# Patient Record
Sex: FEMALE | Race: BLACK OR AFRICAN AMERICAN | NOT HISPANIC OR LATINO | Employment: FULL TIME | URBAN - METROPOLITAN AREA
[De-identification: names, ages, dates, MRNs, and addresses within clinical notes are randomized per-mention and may not be internally consistent; named-entity substitution may affect disease eponyms.]

---

## 2017-10-12 ENCOUNTER — HOSPITAL ENCOUNTER (EMERGENCY)
Facility: HOSPITAL | Age: 52
Discharge: HOME | End: 2017-10-12
Attending: EMERGENCY MEDICINE
Payer: COMMERCIAL

## 2017-10-12 VITALS
HEIGHT: 65 IN | DIASTOLIC BLOOD PRESSURE: 91 MMHG | OXYGEN SATURATION: 99 % | RESPIRATION RATE: 18 BRPM | TEMPERATURE: 99 F | HEART RATE: 69 BPM | BODY MASS INDEX: 29.16 KG/M2 | WEIGHT: 175 LBS | SYSTOLIC BLOOD PRESSURE: 131 MMHG

## 2017-10-12 DIAGNOSIS — M17.11 ARTHRITIS OF RIGHT KNEE: Primary | ICD-10-CM

## 2017-10-12 DIAGNOSIS — R52 PAIN: ICD-10-CM

## 2017-10-12 PROCEDURE — 63600175 PHARM REV CODE 636 W HCPCS: Performed by: EMERGENCY MEDICINE

## 2017-10-12 PROCEDURE — 99284 EMERGENCY DEPT VISIT MOD MDM: CPT

## 2017-10-12 PROCEDURE — 29505 APPLICATION LONG LEG SPLINT: CPT | Mod: RT

## 2017-10-12 RX ORDER — MELOXICAM 15 MG/1
15 TABLET ORAL DAILY
Qty: 30 TABLET | Refills: 0 | Status: SHIPPED | OUTPATIENT
Start: 2017-10-12

## 2017-10-12 RX ORDER — METHYLPREDNISOLONE 4 MG/1
TABLET ORAL
Qty: 1 PACKAGE | Refills: 0 | Status: SHIPPED | OUTPATIENT
Start: 2017-10-12 | End: 2017-11-02

## 2017-10-12 RX ORDER — CELECOXIB 200 MG/1
200 CAPSULE ORAL 2 TIMES DAILY
COMMUNITY
End: 2017-10-12 | Stop reason: HOSPADM

## 2017-10-12 RX ORDER — AMLODIPINE BESYLATE 10 MG/1
10 TABLET ORAL DAILY
COMMUNITY

## 2017-10-12 RX ORDER — PREDNISONE 20 MG/1
60 TABLET ORAL
Status: COMPLETED | OUTPATIENT
Start: 2017-10-12 | End: 2017-10-12

## 2017-10-12 RX ORDER — TRAMADOL HYDROCHLORIDE 50 MG/1
50 TABLET ORAL EVERY 6 HOURS PRN
Qty: 20 TABLET | Refills: 0 | Status: SHIPPED | OUTPATIENT
Start: 2017-10-12

## 2017-10-12 RX ORDER — OXYCODONE AND ACETAMINOPHEN 5; 325 MG/1; MG/1
1 TABLET ORAL EVERY 6 HOURS PRN
Qty: 18 TABLET | Refills: 0 | Status: SHIPPED | OUTPATIENT
Start: 2017-10-12 | End: 2017-10-12 | Stop reason: SINTOL

## 2017-10-12 RX ADMIN — PREDNISONE 60 MG: 20 TABLET ORAL at 01:10

## 2017-10-12 NOTE — ED PROVIDER NOTES
"Encounter Date: 10/12/2017       History     Chief Complaint   Patient presents with    Knee Pain     swelling since this morning, with a slight limp all week, pt with history of arhritis, no warmth or redness report per pt     CHIEF COMPLIANT: Knee Pain (swelling since this morning, with a slight limp all week, pt with history of arhritis, no warmth or redness report per pt)      10/12/2017, 10:51 AM     The history is provided by the patient. Christina Bangura is a 52 y.o. female presenting to the ED for right knee pain.  Patient has been having knee pain all week.  She has a prior history of meniscus tear.  She states that the pain has gradually worsened over the last 7 days.  The pain is at a 0 when she is sitting still, however when she is attempting to bend her knee or stand up, the pain goes up to a 6 out of 10.  The pain is located to the right knee.  Patient denies any trauma, calf pain, calf tenderness, chest pain, chest pressure, fever, sickle cell disease.  Prior treatment includes Celebrex 200 mg twice a day.    PCP: Provider [facility]  Specialist:             Review of patient's allergies indicates:   Allergen Reactions    Hydrocodone Palpitations     "chest distress" per patient     Past Medical History:   Diagnosis Date    Arthritis     Hypertension      Past Surgical History:   Procedure Laterality Date     SECTION      CHOLECYSTECTOMY      HYSTERECTOMY       History reviewed. No pertinent family history.  Social History   Substance Use Topics    Smoking status: Never Smoker    Smokeless tobacco: Never Used    Alcohol use Yes      Comment: seldom     Review of Systems   Constitutional: Negative for fever.   HENT: Negative for sore throat.    Respiratory: Negative for shortness of breath.    Cardiovascular: Negative for chest pain.   Gastrointestinal: Negative for nausea.   Genitourinary: Negative for dysuria.   Musculoskeletal: Positive for arthralgias (Right knee). Negative for " "back pain.   Skin: Negative for rash.   Neurological: Negative for weakness.   Hematological: Does not bruise/bleed easily.       Physical Exam     Initial Vitals [10/12/17 1024]   BP Pulse Resp Temp SpO2   (!) 162/89 76 20 98.5 °F (36.9 °C) 98 %      MAP       113.33         Vitals:    10/12/17 1024 10/12/17 1254 10/12/17 1339   BP: (!) 162/89 (!) 133/97 (!) 131/91   Pulse: 76 73 69   Resp: 20 18 18   Temp: 98.5 °F (36.9 °C)     TempSrc: Oral     SpO2: 98% 100% 99%   Weight: 79.4 kg (175 lb)     Height: 5' 5" (1.651 m)         Physical Exam    Nursing note and vitals reviewed.  Constitutional: She appears well-developed and well-nourished.   Obese.   HENT:   Head: Normocephalic and atraumatic.   Eyes: Conjunctivae and EOM are normal. Pupils are equal, round, and reactive to light.   Neck: Normal range of motion.   Cardiovascular: Normal rate, regular rhythm and normal heart sounds.   Pulmonary/Chest: Breath sounds normal. No respiratory distress.   Abdominal: Soft. Bowel sounds are normal. She exhibits no mass. There is no rebound and no guarding.   Musculoskeletal: Normal range of motion.        Right knee: She exhibits swelling, effusion and bony tenderness. She exhibits no ecchymosis and no deformity.        Cervical back: She exhibits no tenderness and no bony tenderness.        Thoracic back: She exhibits no tenderness and no bony tenderness.        Lumbar back: She exhibits no tenderness and no bony tenderness.   Range of motion of the right hip, normal range of motion of the left ankle.   Neurological: She is alert and oriented to person, place, and time. She has normal strength. No cranial nerve deficit.   Cranial nerves II-XII intact   Skin: Skin is warm and dry.   Psychiatric: She has a normal mood and affect. Her speech is normal and behavior is normal. Thought content normal.         ED Course   Splint Application  Date/Time: 10/12/2017 1:58 PM  Performed by: EVAN MADDOX.  Authorized by: PASHA, " PENNY BOTELLOEmilie   Location details: right knee  Supplies used: Knee immobilizer.  Post-procedure: The splinted body part was neurovascularly unchanged following the procedure.  Patient tolerance: Patient tolerated the procedure well with no immediate complications        Labs Reviewed - No data to display     Imaging Results          CT Lower Extremity Without Cont Right (Final result)  Result time 10/12/17 13:09:56    Final result by Humphrey Mitchell MD (10/12/17 13:09:56)                 Impression:         No evidence of acute fracture.  Tricompartment DJD most pronounced involving the lateral knee compartment.  Joint effusion.            All CT scans at this facility use dose modulation, iterative reconstruction and/or weight based dosing when appropriate to reduce radiation dose to as low as reasonably achievable.       Electronically signed by: HUMPHREY MITCHELL MD  Date:     10/12/17  Time:    13:09              Narrative:    CT LOWER EXTREMITY WITHOUT CONT RIGHT    Date: 10/12/17 12:40:22    Clinical indication:  right knee pain     Technique:  CT right knee with coronal and sagittal reformats. Correlation with plain film x-ray from the same day.     Findings:  No acute fracture line is seen.  Lateral joint space narrowing with subchondral sclerosis is present.  Marginal spurring both of the medial and the lateral femoral condyle is present.  Moderate patellofemoral joint spurring is present.    Small joint effusion is present.     No additional findings.                             X-Ray Knee Complete 4 Or More Views Right (Final result)  Result time 10/12/17 11:42:52    Final result by CHEN Shore Sr., MD (10/12/17 11:42:52)                 Impression:      1. There is a possible nondisplaced fracture in the lateral femoral condyle. If additional imaging evaluation is clinically indicated, I recommend consideration of a CT examination of the right knee without IV contrast.  2. There is a moderate-sized  joint effusion in the right knee.  3. There is moderate narrowing of the joint space height of the lateral compartment of the right knee.      Electronically signed by: CHEN DIEZ MD  Date:     10/12/17  Time:    11:42              Narrative:    4 view x-ray of the right knee    Clinical History:     Pain, unspecified    Findings: There is a possible nondisplaced fracture in the lateral femoral condyle. There is no dislocation. There is a moderate-sized joint effusion in the right knee. There is moderate narrowing of the joint space height of the lateral compartment of the right knee.                                                   ED Course    No results found for this or any previous visit.    Medications   predniSONE tablet 60 mg (60 mg Oral Given 10/12/17 3291)       1:31 PM Reassessment: Dr. Garvin reassessed the pt.  The pt is resting comfortably and is NAD.  Pt states their sx have improved. Discussed test results, shared treatment plan, specific conditions for return, and the need for f/u.  Answered their questions at this time.  Pt understands and agrees to the plan.  The pt has remained hemodynamically stable through ED course and is stable for discharge.     1:57 PM patient states that she can not take percocet either. Will change to ultram.     Follow-up Information     [facility]. Schedule an appointment as soon as possible for a visit in 2 days.    Specialty:  Orthopedics  Why:  Return to the ED for:  increasing pain, redness, swelling or other concerns.  Contact information:  [address] [address] [address] [phone]                   Discharge Medication List as of 10/12/2017  2:03 PM      START taking these medications    Details   meloxicam (MOBIC) 15 MG tablet Take 1 tablet (15 mg total) by mouth once daily., Starting Thu 10/12/2017, Print      methylPREDNISolone (MEDROL DOSEPACK) 4 mg tablet As directed., Print      tramadol (ULTRAM) 50 mg tablet Take 1  tablet (50 mg total) by mouth every 6 (six) hours as needed for Pain., Starting Thu 10/12/2017, Print              Discharge Medication List as of 10/12/2017  2:03 PM      STOP taking these medications       celecoxib (CELEBREX) 200 MG capsule Comments:   Reason for Stopping:                     Clinical Impression:       ICD-10-CM ICD-9-CM   1. Arthritis of right knee M17.11 716.96   2. Pain R52 780.96         Disposition:   Disposition: Discharged  Condition: Stable                        Denise Garvin, DO  10/12/17 3112

## 2018-04-16 ENCOUNTER — HOSPITAL ENCOUNTER (EMERGENCY)
Facility: HOSPITAL | Age: 53
Discharge: HOME | End: 2018-04-16
Attending: EMERGENCY MEDICINE
Payer: COMMERCIAL

## 2018-04-16 VITALS
HEART RATE: 92 BPM | RESPIRATION RATE: 20 BRPM | OXYGEN SATURATION: 98 % | BODY MASS INDEX: 46.48 KG/M2 | SYSTOLIC BLOOD PRESSURE: 137 MMHG | WEIGHT: 279 LBS | DIASTOLIC BLOOD PRESSURE: 92 MMHG | HEIGHT: 65 IN | TEMPERATURE: 98 F

## 2018-04-16 DIAGNOSIS — G89.29 CHRONIC PAIN OF RIGHT KNEE: Primary | ICD-10-CM

## 2018-04-16 DIAGNOSIS — M25.561 CHRONIC PAIN OF RIGHT KNEE: Primary | ICD-10-CM

## 2018-04-16 PROCEDURE — 99283 EMERGENCY DEPT VISIT LOW MDM: CPT

## 2018-04-16 RX ORDER — MELOXICAM 15 MG/1
15 TABLET ORAL DAILY
Qty: 20 TABLET | Refills: 0 | Status: SHIPPED | OUTPATIENT
Start: 2018-04-16

## 2018-04-16 NOTE — ED NOTES
Pt rep[orts having steroid injection to left knee on Tuesday. Pt states swelling to face and right knee since Friday, none noted today

## 2018-04-16 NOTE — ED PROVIDER NOTES
"Encounter Date: 2018       History     Chief Complaint   Patient presents with    Allergic Reaction     "i think im having a reaction to the injection I got in my knee" since friday. injection on tuesday     The history is provided by the patient.   Allergic Reaction   The primary symptoms do not include wheezing, shortness of breath, cough, abdominal pain, nausea, vomiting, diarrhea, dizziness, palpitations, altered mental status, rash, angioedema or urticaria. Primary symptoms comment: Right knee pain- had an injection last week and follow up tomorrow. Illness onset: Chronically. The problem has been rapidly improving. This is a chronic problem.   Associated with: recent injection in knee. Significant symptoms that are not present include eye redness, flushing, rhinorrhea or itching. Associated symptoms comments: Chronic Pain.     Review of patient's allergies indicates:   Allergen Reactions    Hydrocodone Palpitations     "chest distress" per patient    Percocet [oxycodone-acetaminophen]      Past Medical History:   Diagnosis Date    Arthritis     Hypertension      Past Surgical History:   Procedure Laterality Date     SECTION      CHOLECYSTECTOMY      HYSTERECTOMY       History reviewed. No pertinent family history.  Social History   Substance Use Topics    Smoking status: Never Smoker    Smokeless tobacco: Never Used    Alcohol use Yes      Comment: seldom     Review of Systems   HENT: Negative for rhinorrhea.    Eyes: Negative for redness.   Respiratory: Negative for cough, shortness of breath and wheezing.    Cardiovascular: Negative for palpitations.   Gastrointestinal: Negative for abdominal pain, diarrhea, nausea and vomiting.   Musculoskeletal:        Right Knee Pain-Chronic   Skin: Negative for flushing, itching and rash.   Neurological: Negative for dizziness.   All other systems reviewed and are negative.      Physical Exam     Initial Vitals [18 0719]   BP Pulse Resp Temp " SpO2   (!) 137/92 92 20 97.6 °F (36.4 °C) 98 %      MAP       107         Physical Exam    Nursing note and vitals reviewed.  Constitutional: She appears well-developed and well-nourished. No distress.   HENT:   Head: Normocephalic and atraumatic.   Mouth/Throat: Oropharynx is clear and moist.   Eyes: EOM are normal. Pupils are equal, round, and reactive to light.   Neck: Normal range of motion. Neck supple.   Cardiovascular: Normal rate and regular rhythm.   Pulmonary/Chest: Breath sounds normal.   Abdominal: Soft. Bowel sounds are normal.   Musculoskeletal: Normal range of motion. She exhibits no tenderness.        Right knee: Normal. She exhibits normal range of motion, no swelling, no effusion, no ecchymosis, no deformity, no laceration, no erythema, normal alignment, no LCL laxity, normal patellar mobility, no bony tenderness, normal meniscus and no MCL laxity. No tenderness found.   Neurological: She is alert and oriented to person, place, and time. She has normal strength.         ED Course   Procedures  Labs Reviewed - No data to display     7:40 AM - Counseling: Spoke with the patient and discussed todays findings, in addition to providing specific details for the plan of care and counseling regarding the diagnosis and prognosis. Questions are answered at this time. Pt reports having follow up tomorrow with provider that injected her knee. I discussed her normal exam and need to follow up with provider and no signs of allergic reaction including but not limited to negative edemea,swelling,warmth,rash,angioedema,hives,wheezing,VSSAF. Pt in NAD, requesting extended work excuse.                            Clinical Impression:   The encounter diagnosis was Chronic pain of right knee.    Disposition:   Disposition: Discharged  Condition: Stable                        Maicol Munoz MD  04/16/18 0743

## 2019-06-11 ENCOUNTER — HOSPITAL ENCOUNTER (EMERGENCY)
Age: 54
Discharge: LEFT BEFORE DISCHARGE | End: 2019-06-11

## 2019-06-12 NOTE — ED NOTES
When PT was called back to be roomed, she stated, \"I'm going to leave. \"  PT was told she would be room and evaluated now, and that there was no longer a wait, but again, she refused. PT did not appear to be in any visible distress.  Skin color normal for

## 2019-10-22 PROBLEM — K57.30 DIVERTICULOSIS OF LARGE INTESTINE WITHOUT HEMORRHAGE: Status: ACTIVE | Noted: 2019-10-22

## 2019-11-12 PROBLEM — Z98.890 HISTORY OF INCISIONAL HERNIA REPAIR: Status: ACTIVE | Noted: 2019-11-12

## 2019-11-12 PROBLEM — Z98.84 S/P GASTRIC BYPASS: Status: ACTIVE | Noted: 2019-11-12

## 2019-11-12 PROBLEM — Z87.19 HISTORY OF INCISIONAL HERNIA REPAIR: Status: ACTIVE | Noted: 2019-11-12

## 2019-11-27 PROCEDURE — 88305 TISSUE EXAM BY PATHOLOGIST: CPT | Performed by: INTERNAL MEDICINE

## 2020-06-17 PROBLEM — K59.09 CHRONIC CONSTIPATION: Status: ACTIVE | Noted: 2020-06-17

## 2020-09-07 ENCOUNTER — APPOINTMENT (OUTPATIENT)
Dept: CT IMAGING | Age: 55
End: 2020-09-07
Attending: EMERGENCY MEDICINE
Payer: COMMERCIAL

## 2020-09-07 ENCOUNTER — HOSPITAL ENCOUNTER (EMERGENCY)
Age: 55
Discharge: HOME | End: 2020-09-08
Attending: EMERGENCY MEDICINE
Payer: COMMERCIAL

## 2020-09-07 DIAGNOSIS — R51.9 RIGHT SIDED FACIAL PAIN: Primary | ICD-10-CM

## 2020-09-07 LAB
ALBUMIN SERPL-MCNC: 3.3 G/DL (ref 3.4–5)
ALBUMIN/GLOB SERPL: 1 {RATIO} (ref 1–2)
ALP LIVER SERPL-CCNC: 107 U/L (ref 46–118)
ALT SERPL-CCNC: 50 U/L (ref 13–56)
ANION GAP SERPL CALC-SCNC: 5 MMOL/L (ref 0–18)
AST SERPL-CCNC: 35 U/L (ref 15–37)
BASOPHILS # BLD AUTO: 0.04 X10(3) UL (ref 0–0.2)
BASOPHILS NFR BLD AUTO: 0.6 %
BILIRUB SERPL-MCNC: 0.3 MG/DL (ref 0.1–2)
BUN BLD-MCNC: 19 MG/DL (ref 7–18)
BUN/CREAT SERPL: 26.8 (ref 10–20)
CALCIUM BLD-MCNC: 8.9 MG/DL (ref 8.5–10.1)
CHLORIDE SERPL-SCNC: 109 MMOL/L (ref 98–112)
CO2 SERPL-SCNC: 27 MMOL/L (ref 21–32)
CREAT BLD-MCNC: 0.71 MG/DL (ref 0.55–1.02)
DEPRECATED RDW RBC AUTO: 46.5 FL (ref 35.1–46.3)
EOSINOPHIL # BLD AUTO: 0.17 X10(3) UL (ref 0–0.7)
EOSINOPHIL NFR BLD AUTO: 2.8 %
ERYTHROCYTE [DISTWIDTH] IN BLOOD BY AUTOMATED COUNT: 14.8 % (ref 11–15)
GLOBULIN PLAS-MCNC: 3.4 G/DL (ref 2.8–4.4)
GLUCOSE BLD-MCNC: 88 MG/DL (ref 70–99)
HCT VFR BLD AUTO: 36.9 % (ref 35–48)
HGB BLD-MCNC: 11.7 G/DL (ref 12–16)
IMM GRANULOCYTES # BLD AUTO: 0.01 X10(3) UL (ref 0–1)
IMM GRANULOCYTES NFR BLD: 0.2 %
LYMPHOCYTES # BLD AUTO: 1.55 X10(3) UL (ref 1–4)
LYMPHOCYTES NFR BLD AUTO: 25.1 %
M PROTEIN MFR SERPL ELPH: 6.7 G/DL (ref 6.4–8.2)
MCH RBC QN AUTO: 27.4 PG (ref 26–34)
MCHC RBC AUTO-ENTMCNC: 31.7 G/DL (ref 31–37)
MCV RBC AUTO: 86.4 FL (ref 80–100)
MONOCYTES # BLD AUTO: 0.42 X10(3) UL (ref 0.1–1)
MONOCYTES NFR BLD AUTO: 6.8 %
NEUTROPHILS # BLD AUTO: 3.98 X10 (3) UL (ref 1.5–7.7)
NEUTROPHILS # BLD AUTO: 3.98 X10(3) UL (ref 1.5–7.7)
NEUTROPHILS NFR BLD AUTO: 64.5 %
OSMOLALITY SERPL CALC.SUM OF ELEC: 294 MOSM/KG (ref 275–295)
PLATELET # BLD AUTO: 204 10(3)UL (ref 150–450)
POTASSIUM SERPL-SCNC: 3.7 MMOL/L (ref 3.5–5.1)
RBC # BLD AUTO: 4.27 X10(6)UL (ref 3.8–5.3)
SODIUM SERPL-SCNC: 141 MMOL/L (ref 136–145)
WBC # BLD AUTO: 6.2 X10(3) UL (ref 4–11)

## 2020-09-07 PROCEDURE — 80053 COMPREHEN METABOLIC PANEL: CPT | Performed by: EMERGENCY MEDICINE

## 2020-09-07 PROCEDURE — 70450 CT HEAD/BRAIN W/O DYE: CPT | Performed by: EMERGENCY MEDICINE

## 2020-09-07 PROCEDURE — 85025 COMPLETE CBC W/AUTO DIFF WBC: CPT | Performed by: EMERGENCY MEDICINE

## 2020-09-07 PROCEDURE — 36415 COLL VENOUS BLD VENIPUNCTURE: CPT

## 2020-09-07 PROCEDURE — 99284 EMERGENCY DEPT VISIT MOD MDM: CPT

## 2020-09-07 PROCEDURE — 70487 CT MAXILLOFACIAL W/DYE: CPT | Performed by: EMERGENCY MEDICINE

## 2020-09-07 RX ORDER — AMOXICILLIN AND CLAVULANATE POTASSIUM 875; 125 MG/1; MG/1
1 TABLET, FILM COATED ORAL 2 TIMES DAILY
Qty: 20 TABLET | Refills: 0 | Status: SHIPPED | OUTPATIENT
Start: 2020-09-07 | End: 2020-09-17

## 2020-09-08 VITALS
SYSTOLIC BLOOD PRESSURE: 159 MMHG | DIASTOLIC BLOOD PRESSURE: 56 MMHG | HEIGHT: 65 IN | BODY MASS INDEX: 31.16 KG/M2 | RESPIRATION RATE: 20 BRPM | OXYGEN SATURATION: 98 % | TEMPERATURE: 99 F | HEART RATE: 78 BPM | WEIGHT: 187 LBS

## 2020-09-08 RX ORDER — AMOXICILLIN AND CLAVULANATE POTASSIUM 875; 125 MG/1; MG/1
1 TABLET, FILM COATED ORAL ONCE
Status: COMPLETED | OUTPATIENT
Start: 2020-09-08 | End: 2020-09-08

## 2020-09-08 NOTE — ED INITIAL ASSESSMENT (HPI)
Pt to ed from home with c/o r side facial/jaw pain, denies injury or trauma, pt states pain radiates to head area,pt sx present last 24 hrs, pt alos c/o sob.

## 2020-09-08 NOTE — ED PROVIDER NOTES
Patient Seen in: THE [facility] In [place]      History   Patient presents with:  Pain    Stated Complaint:  right sided facial pain    HPI    Patient is a pleasant 53-year-old female, with multiple past medical problems, presenting for e Bx negative                    Social History    Tobacco Use      Smoking status: Never Smoker      Smokeless tobacco: Never Used    Alcohol use: Yes      Comment: rarely    Drug use:  No             Review of Systems    Positive for stated complaint:  susan Albumin 3.3 (*)     All other components within normal limits   CBC W/ DIFFERENTIAL - Abnormal; Notable for the following components:    HGB 11.7 (*)     RDW-SD 46.5 (*)     All other components within normal limits   CBC WITH DIFFERENTIAL WITH PLATELET BONES(CONTRAST ONLY) (CPT=70487)  COMPARISON:  [facility], CT, CT BRAIN OR HEAD (55638), 9/07/2020, 10:45 PM.  INDICATIONS:  right sided facial pain  TECHNIQUE:  CT images were created with non-ionic intravenous contrast through the facial bones.   3D shade initiated. Patient was observed while the laboratory and radiology studies were obtained. Results of the patient's laboratory and radiology studies were reviewed and discussed with the patient. Developing facial infection cannot be excluded.   A prescri

## 2020-10-05 PROBLEM — G44.209 TENSION HEADACHE: Status: ACTIVE | Noted: 2020-10-05

## 2020-10-05 PROBLEM — M26.609 TMJ DYSFUNCTION: Status: ACTIVE | Noted: 2020-10-05

## 2020-11-12 ENCOUNTER — APPOINTMENT (OUTPATIENT)
Dept: CT IMAGING | Facility: HOSPITAL | Age: 55
DRG: 336 | End: 2020-11-12
Attending: HOSPITALIST
Payer: COMMERCIAL

## 2020-11-12 ENCOUNTER — HOSPITAL ENCOUNTER (INPATIENT)
Facility: HOSPITAL | Age: 55
LOS: 5 days | Discharge: HOME HEALTH | DRG: 336 | End: 2020-11-17
Attending: EMERGENCY MEDICINE | Admitting: SURGERY
Payer: COMMERCIAL

## 2020-11-12 ENCOUNTER — APPOINTMENT (OUTPATIENT)
Dept: CT IMAGING | Age: 55
DRG: 336 | End: 2020-11-12
Attending: EMERGENCY MEDICINE
Payer: COMMERCIAL

## 2020-11-12 ENCOUNTER — ANESTHESIA (OUTPATIENT)
Dept: SURGERY | Facility: HOSPITAL | Age: 55
DRG: 336 | End: 2020-11-12
Payer: COMMERCIAL

## 2020-11-12 ENCOUNTER — ANESTHESIA EVENT (OUTPATIENT)
Dept: SURGERY | Facility: HOSPITAL | Age: 55
DRG: 336 | End: 2020-11-12
Payer: COMMERCIAL

## 2020-11-12 DIAGNOSIS — K56.609 SMALL BOWEL OBSTRUCTION (HCC): ICD-10-CM

## 2020-11-12 DIAGNOSIS — K56.609 BOWEL OBSTRUCTION (HCC): Primary | ICD-10-CM

## 2020-11-12 DIAGNOSIS — R10.9 ABDOMINAL PAIN, ACUTE: Primary | ICD-10-CM

## 2020-11-12 PROBLEM — R73.9 HYPERGLYCEMIA: Status: ACTIVE | Noted: 2020-11-12

## 2020-11-12 PROBLEM — R79.89 AZOTEMIA: Status: ACTIVE | Noted: 2020-11-12

## 2020-11-12 PROCEDURE — 70450 CT HEAD/BRAIN W/O DYE: CPT | Performed by: HOSPITALIST

## 2020-11-12 PROCEDURE — 0DN80ZZ RELEASE SMALL INTESTINE, OPEN APPROACH: ICD-10-PCS | Performed by: SURGERY

## 2020-11-12 PROCEDURE — 0D9670Z DRAINAGE OF STOMACH WITH DRAINAGE DEVICE, VIA NATURAL OR ARTIFICIAL OPENING: ICD-10-PCS | Performed by: INTERNAL MEDICINE

## 2020-11-12 PROCEDURE — 74177 CT ABD & PELVIS W/CONTRAST: CPT | Performed by: EMERGENCY MEDICINE

## 2020-11-12 DEVICE — SEPRAFILM ADHESION BARRIER (MEMBRANE) IS A STERILE, BIORESORBABLE, TRANSLUCENT ADHESION BARRIER COMPOSED OF TWO ANIONIC POLYSACCHARIDES, SODIUM HYALURONATE (HA) AND CARBOXYMETHYLCELLULOSE (CMC).
Type: IMPLANTABLE DEVICE | Site: ABDOMEN | Status: FUNCTIONAL
Brand: SEPRAFILM

## 2020-11-12 RX ORDER — NALOXONE HYDROCHLORIDE 0.4 MG/ML
0.08 INJECTION, SOLUTION INTRAMUSCULAR; INTRAVENOUS; SUBCUTANEOUS
Status: DISCONTINUED | OUTPATIENT
Start: 2020-11-12 | End: 2020-11-16

## 2020-11-12 RX ORDER — BUPIVACAINE HYDROCHLORIDE 5 MG/ML
INJECTION, SOLUTION EPIDURAL; INTRACAUDAL AS NEEDED
Status: DISCONTINUED | OUTPATIENT
Start: 2020-11-12 | End: 2020-11-12 | Stop reason: HOSPADM

## 2020-11-12 RX ORDER — ONDANSETRON 2 MG/ML
4 INJECTION INTRAMUSCULAR; INTRAVENOUS EVERY 4 HOURS PRN
Status: CANCELLED | OUTPATIENT
Start: 2020-11-12

## 2020-11-12 RX ORDER — KETOROLAC TROMETHAMINE 30 MG/ML
30 INJECTION, SOLUTION INTRAMUSCULAR; INTRAVENOUS EVERY 6 HOURS PRN
Status: DISCONTINUED | OUTPATIENT
Start: 2020-11-12 | End: 2020-11-12

## 2020-11-12 RX ORDER — ONDANSETRON 2 MG/ML
4 INJECTION INTRAMUSCULAR; INTRAVENOUS EVERY 6 HOURS PRN
Status: DISCONTINUED | OUTPATIENT
Start: 2020-11-12 | End: 2020-11-16

## 2020-11-12 RX ORDER — SODIUM CHLORIDE 9 MG/ML
INJECTION, SOLUTION INTRAVENOUS CONTINUOUS
Status: DISCONTINUED | OUTPATIENT
Start: 2020-11-12 | End: 2020-11-17

## 2020-11-12 RX ORDER — MEPERIDINE HYDROCHLORIDE 25 MG/ML
12.5 INJECTION INTRAMUSCULAR; INTRAVENOUS; SUBCUTANEOUS AS NEEDED
Status: DISCONTINUED | OUTPATIENT
Start: 2020-11-12 | End: 2020-11-12 | Stop reason: HOSPADM

## 2020-11-12 RX ORDER — ACETAMINOPHEN 500 MG
1000 TABLET ORAL ONCE
Status: DISCONTINUED | OUTPATIENT
Start: 2020-11-12 | End: 2020-11-12 | Stop reason: HOSPADM

## 2020-11-12 RX ORDER — ROCURONIUM BROMIDE 10 MG/ML
INJECTION, SOLUTION INTRAVENOUS AS NEEDED
Status: DISCONTINUED | OUTPATIENT
Start: 2020-11-12 | End: 2020-11-12 | Stop reason: SURG

## 2020-11-12 RX ORDER — ONDANSETRON 2 MG/ML
4 INJECTION INTRAMUSCULAR; INTRAVENOUS ONCE
Status: COMPLETED | OUTPATIENT
Start: 2020-11-12 | End: 2020-11-12

## 2020-11-12 RX ORDER — HYDROMORPHONE HYDROCHLORIDE 1 MG/ML
0.4 INJECTION, SOLUTION INTRAMUSCULAR; INTRAVENOUS; SUBCUTANEOUS EVERY 5 MIN PRN
Status: DISCONTINUED | OUTPATIENT
Start: 2020-11-12 | End: 2020-11-12 | Stop reason: HOSPADM

## 2020-11-12 RX ORDER — METOCLOPRAMIDE HYDROCHLORIDE 5 MG/ML
10 INJECTION INTRAMUSCULAR; INTRAVENOUS EVERY 6 HOURS PRN
Status: DISCONTINUED | OUTPATIENT
Start: 2020-11-12 | End: 2020-11-17

## 2020-11-12 RX ORDER — SODIUM CHLORIDE, SODIUM LACTATE, POTASSIUM CHLORIDE, CALCIUM CHLORIDE 600; 310; 30; 20 MG/100ML; MG/100ML; MG/100ML; MG/100ML
INJECTION, SOLUTION INTRAVENOUS CONTINUOUS
Status: DISCONTINUED | OUTPATIENT
Start: 2020-11-12 | End: 2020-11-12 | Stop reason: ALTCHOICE

## 2020-11-12 RX ORDER — MIDAZOLAM HYDROCHLORIDE 1 MG/ML
INJECTION INTRAMUSCULAR; INTRAVENOUS AS NEEDED
Status: DISCONTINUED | OUTPATIENT
Start: 2020-11-12 | End: 2020-11-12 | Stop reason: SURG

## 2020-11-12 RX ORDER — MORPHINE SULFATE 4 MG/ML
4 INJECTION, SOLUTION INTRAMUSCULAR; INTRAVENOUS EVERY 2 HOUR PRN
Status: CANCELLED | OUTPATIENT
Start: 2020-11-12

## 2020-11-12 RX ORDER — ONDANSETRON 2 MG/ML
INJECTION INTRAMUSCULAR; INTRAVENOUS AS NEEDED
Status: DISCONTINUED | OUTPATIENT
Start: 2020-11-12 | End: 2020-11-12 | Stop reason: SURG

## 2020-11-12 RX ORDER — SODIUM CHLORIDE, SODIUM LACTATE, POTASSIUM CHLORIDE, CALCIUM CHLORIDE 600; 310; 30; 20 MG/100ML; MG/100ML; MG/100ML; MG/100ML
INJECTION, SOLUTION INTRAVENOUS CONTINUOUS
Status: DISCONTINUED | OUTPATIENT
Start: 2020-11-12 | End: 2020-11-12 | Stop reason: HOSPADM

## 2020-11-12 RX ORDER — FAMOTIDINE 10 MG/ML
20 INJECTION, SOLUTION INTRAVENOUS 2 TIMES DAILY
Status: DISCONTINUED | OUTPATIENT
Start: 2020-11-12 | End: 2020-11-17

## 2020-11-12 RX ORDER — HEPARIN SODIUM 5000 [USP'U]/ML
5000 INJECTION, SOLUTION INTRAVENOUS; SUBCUTANEOUS EVERY 8 HOURS SCHEDULED
Status: CANCELLED | OUTPATIENT
Start: 2020-11-12

## 2020-11-12 RX ORDER — HYDROMORPHONE HYDROCHLORIDE 1 MG/ML
0.5 INJECTION, SOLUTION INTRAMUSCULAR; INTRAVENOUS; SUBCUTANEOUS EVERY 30 MIN PRN
Status: COMPLETED | OUTPATIENT
Start: 2020-11-12 | End: 2020-11-12

## 2020-11-12 RX ORDER — LIDOCAINE HYDROCHLORIDE 10 MG/ML
INJECTION, SOLUTION INFILTRATION; PERINEURAL AS NEEDED
Status: DISCONTINUED | OUTPATIENT
Start: 2020-11-12 | End: 2020-11-12 | Stop reason: HOSPADM

## 2020-11-12 RX ORDER — SODIUM CHLORIDE 9 MG/ML
INJECTION, SOLUTION INTRAVENOUS CONTINUOUS
Status: CANCELLED | OUTPATIENT
Start: 2020-11-12 | End: 2020-11-12

## 2020-11-12 RX ORDER — ACETAMINOPHEN 325 MG/1
650 TABLET ORAL EVERY 4 HOURS PRN
Status: CANCELLED | OUTPATIENT
Start: 2020-11-12

## 2020-11-12 RX ORDER — LIDOCAINE HYDROCHLORIDE 10 MG/ML
INJECTION, SOLUTION EPIDURAL; INFILTRATION; INTRACAUDAL; PERINEURAL AS NEEDED
Status: DISCONTINUED | OUTPATIENT
Start: 2020-11-12 | End: 2020-11-12 | Stop reason: SURG

## 2020-11-12 RX ORDER — HYDROMORPHONE HYDROCHLORIDE 1 MG/ML
0.5 INJECTION, SOLUTION INTRAMUSCULAR; INTRAVENOUS; SUBCUTANEOUS EVERY 30 MIN PRN
Status: CANCELLED | OUTPATIENT
Start: 2020-11-12 | End: 2020-11-12

## 2020-11-12 RX ORDER — HYDROCODONE BITARTRATE AND ACETAMINOPHEN 5; 325 MG/1; MG/1
1 TABLET ORAL EVERY 4 HOURS PRN
Status: DISCONTINUED | OUTPATIENT
Start: 2020-11-12 | End: 2020-11-16

## 2020-11-12 RX ORDER — MORPHINE SULFATE 4 MG/ML
2 INJECTION, SOLUTION INTRAMUSCULAR; INTRAVENOUS EVERY 2 HOUR PRN
Status: CANCELLED | OUTPATIENT
Start: 2020-11-12

## 2020-11-12 RX ORDER — HYDROMORPHONE HYDROCHLORIDE 1 MG/ML
0.2 INJECTION, SOLUTION INTRAMUSCULAR; INTRAVENOUS; SUBCUTANEOUS EVERY 2 HOUR PRN
Status: DISCONTINUED | OUTPATIENT
Start: 2020-11-12 | End: 2020-11-12

## 2020-11-12 RX ORDER — SODIUM CHLORIDE, SODIUM LACTATE, POTASSIUM CHLORIDE, CALCIUM CHLORIDE 600; 310; 30; 20 MG/100ML; MG/100ML; MG/100ML; MG/100ML
INJECTION, SOLUTION INTRAVENOUS CONTINUOUS
Status: DISCONTINUED | OUTPATIENT
Start: 2020-11-12 | End: 2020-11-15

## 2020-11-12 RX ORDER — ONDANSETRON 4 MG/1
4 TABLET, ORALLY DISINTEGRATING ORAL EVERY 4 HOURS PRN
Qty: 10 TABLET | Refills: 0 | Status: SHIPPED | OUTPATIENT
Start: 2020-11-12 | End: 2020-11-19

## 2020-11-12 RX ORDER — AMLODIPINE BESYLATE 5 MG/1
5 TABLET ORAL DAILY PRN
COMMUNITY
End: 2021-03-28

## 2020-11-12 RX ORDER — HYDROCODONE BITARTRATE AND ACETAMINOPHEN 5; 325 MG/1; MG/1
2 TABLET ORAL EVERY 4 HOURS PRN
Status: CANCELLED | OUTPATIENT
Start: 2020-11-12

## 2020-11-12 RX ORDER — ONDANSETRON 2 MG/ML
4 INJECTION INTRAMUSCULAR; INTRAVENOUS EVERY 6 HOURS PRN
Status: DISCONTINUED | OUTPATIENT
Start: 2020-11-12 | End: 2020-11-12

## 2020-11-12 RX ORDER — ONDANSETRON 2 MG/ML
4 INJECTION INTRAMUSCULAR; INTRAVENOUS AS NEEDED
Status: DISCONTINUED | OUTPATIENT
Start: 2020-11-12 | End: 2020-11-12 | Stop reason: HOSPADM

## 2020-11-12 RX ORDER — ONDANSETRON 2 MG/ML
INJECTION INTRAMUSCULAR; INTRAVENOUS
Status: DISPENSED
Start: 2020-11-12 | End: 2020-11-12

## 2020-11-12 RX ORDER — GLYCOPYRROLATE 0.2 MG/ML
INJECTION, SOLUTION INTRAMUSCULAR; INTRAVENOUS AS NEEDED
Status: DISCONTINUED | OUTPATIENT
Start: 2020-11-12 | End: 2020-11-12 | Stop reason: SURG

## 2020-11-12 RX ORDER — PHENYLEPHRINE HCL 10 MG/ML
VIAL (ML) INJECTION AS NEEDED
Status: DISCONTINUED | OUTPATIENT
Start: 2020-11-12 | End: 2020-11-12 | Stop reason: SURG

## 2020-11-12 RX ORDER — CEFOXITIN 2 G/1
2 INJECTION, POWDER, FOR SOLUTION INTRAVENOUS ONCE
Status: COMPLETED | OUTPATIENT
Start: 2020-11-12 | End: 2020-11-12

## 2020-11-12 RX ORDER — HYDROMORPHONE HYDROCHLORIDE 1 MG/ML
INJECTION, SOLUTION INTRAMUSCULAR; INTRAVENOUS; SUBCUTANEOUS
Status: COMPLETED
Start: 2020-11-12 | End: 2020-11-12

## 2020-11-12 RX ORDER — HYDROMORPHONE HYDROCHLORIDE 1 MG/ML
0.4 INJECTION, SOLUTION INTRAMUSCULAR; INTRAVENOUS; SUBCUTANEOUS EVERY 2 HOUR PRN
Status: DISCONTINUED | OUTPATIENT
Start: 2020-11-12 | End: 2020-11-12

## 2020-11-12 RX ORDER — KETOROLAC TROMETHAMINE 30 MG/ML
30 INJECTION, SOLUTION INTRAMUSCULAR; INTRAVENOUS EVERY 6 HOURS PRN
Status: ACTIVE | OUTPATIENT
Start: 2020-11-12 | End: 2020-11-14

## 2020-11-12 RX ORDER — DEXAMETHASONE SODIUM PHOSPHATE 4 MG/ML
VIAL (ML) INJECTION AS NEEDED
Status: DISCONTINUED | OUTPATIENT
Start: 2020-11-12 | End: 2020-11-12 | Stop reason: SURG

## 2020-11-12 RX ORDER — HYDROCODONE BITARTRATE AND ACETAMINOPHEN 5; 325 MG/1; MG/1
2 TABLET ORAL EVERY 4 HOURS PRN
Status: DISCONTINUED | OUTPATIENT
Start: 2020-11-12 | End: 2020-11-16

## 2020-11-12 RX ORDER — ENOXAPARIN SODIUM 100 MG/ML
40 INJECTION SUBCUTANEOUS NIGHTLY
Status: DISCONTINUED | OUTPATIENT
Start: 2020-11-12 | End: 2020-11-17

## 2020-11-12 RX ORDER — FAMOTIDINE 20 MG/1
20 TABLET ORAL 2 TIMES DAILY
Status: DISCONTINUED | OUTPATIENT
Start: 2020-11-12 | End: 2020-11-17

## 2020-11-12 RX ORDER — HYDROMORPHONE HYDROCHLORIDE 1 MG/ML
0.8 INJECTION, SOLUTION INTRAMUSCULAR; INTRAVENOUS; SUBCUTANEOUS EVERY 2 HOUR PRN
Status: DISCONTINUED | OUTPATIENT
Start: 2020-11-12 | End: 2020-11-12

## 2020-11-12 RX ORDER — SODIUM CHLORIDE 9 MG/ML
INJECTION, SOLUTION INTRAVENOUS CONTINUOUS
Status: CANCELLED | OUTPATIENT
Start: 2020-11-12

## 2020-11-12 RX ORDER — DIPHENHYDRAMINE HYDROCHLORIDE 50 MG/ML
12.5 INJECTION INTRAMUSCULAR; INTRAVENOUS EVERY 4 HOURS PRN
Status: DISCONTINUED | OUTPATIENT
Start: 2020-11-12 | End: 2020-11-16

## 2020-11-12 RX ORDER — MORPHINE SULFATE 4 MG/ML
1 INJECTION, SOLUTION INTRAMUSCULAR; INTRAVENOUS EVERY 2 HOUR PRN
Status: CANCELLED | OUTPATIENT
Start: 2020-11-12

## 2020-11-12 RX ORDER — HYDROCODONE BITARTRATE AND ACETAMINOPHEN 5; 325 MG/1; MG/1
1 TABLET ORAL EVERY 4 HOURS PRN
Status: CANCELLED | OUTPATIENT
Start: 2020-11-12

## 2020-11-12 RX ORDER — DIPHENHYDRAMINE HYDROCHLORIDE 50 MG/ML
12.5 INJECTION INTRAMUSCULAR; INTRAVENOUS AS NEEDED
Status: DISCONTINUED | OUTPATIENT
Start: 2020-11-12 | End: 2020-11-12 | Stop reason: HOSPADM

## 2020-11-12 RX ORDER — NALOXONE HYDROCHLORIDE 0.4 MG/ML
80 INJECTION, SOLUTION INTRAMUSCULAR; INTRAVENOUS; SUBCUTANEOUS AS NEEDED
Status: DISCONTINUED | OUTPATIENT
Start: 2020-11-12 | End: 2020-11-12 | Stop reason: HOSPADM

## 2020-11-12 RX ORDER — NEOSTIGMINE METHYLSULFATE 1 MG/ML
INJECTION INTRAVENOUS AS NEEDED
Status: DISCONTINUED | OUTPATIENT
Start: 2020-11-12 | End: 2020-11-12 | Stop reason: SURG

## 2020-11-12 RX ADMIN — NEOSTIGMINE METHYLSULFATE 4 MG: 1 INJECTION INTRAVENOUS at 13:16:00

## 2020-11-12 RX ADMIN — MIDAZOLAM HYDROCHLORIDE 2 MG: 1 INJECTION INTRAMUSCULAR; INTRAVENOUS at 12:19:00

## 2020-11-12 RX ADMIN — LIDOCAINE HYDROCHLORIDE 50 MG: 10 INJECTION, SOLUTION EPIDURAL; INFILTRATION; INTRACAUDAL; PERINEURAL at 12:22:00

## 2020-11-12 RX ADMIN — ONDANSETRON 4 MG: 2 INJECTION INTRAMUSCULAR; INTRAVENOUS at 13:16:00

## 2020-11-12 RX ADMIN — CEFOXITIN 2 G: 2 INJECTION, POWDER, FOR SOLUTION INTRAVENOUS at 12:35:00

## 2020-11-12 RX ADMIN — ROCURONIUM BROMIDE 45 MG: 10 INJECTION, SOLUTION INTRAVENOUS at 12:35:00

## 2020-11-12 RX ADMIN — ROCURONIUM BROMIDE 5 MG: 10 INJECTION, SOLUTION INTRAVENOUS at 12:22:00

## 2020-11-12 RX ADMIN — GLYCOPYRROLATE 0.6 MG: 0.2 INJECTION, SOLUTION INTRAMUSCULAR; INTRAVENOUS at 13:16:00

## 2020-11-12 RX ADMIN — PHENYLEPHRINE HCL 100 MCG: 10 MG/ML VIAL (ML) INJECTION at 12:35:00

## 2020-11-12 RX ADMIN — DEXAMETHASONE SODIUM PHOSPHATE 8 MG: 4 MG/ML VIAL (ML) INJECTION at 12:37:00

## 2020-11-12 NOTE — ANESTHESIA POSTPROCEDURE EVALUATION
Leonard Chiu 486 Patient Status:  Emergency   Age/Gender 60year old female MRN [id number]   Location [address] [facility] Attending Kavon Ontiveros MD   [facility] Day # 0 PCP Kamlesh Muñoz MD       Anesthesia Post-op Note

## 2020-11-12 NOTE — ED PROVIDER NOTES
CT abdomen/pelvis: Jules-en-Y gastric bypass; there are findings which are concerning for complicated small bowel obstruction-either secondary to an internal hernia or complex adhesion/band; surgical consult advised: Newly evident right lower quadrant mesen

## 2020-11-12 NOTE — PLAN OF CARE
Problem: Patient/Family Goals  Goal: Patient/Family Long Term Goal  Description: Patient's Long Term Goal: DISCHARGE HOME    Interventions:  - PAIN TOLERABLE  -TOLERATING DIET  -RETURN TO PREVIOUS ADL'S  - See additional Care Plan goals for specific inte eating environment  Outcome: Progressing  Goal: Achieves appropriate nutritional intake (bariatric)  Description: INTERVENTIONS:  - Monitor for over-consumption  - Identify factors contributing to increased intake, treat as appropriate  - Monitor I&O, WT a

## 2020-11-12 NOTE — OPERATIVE REPORT
[facility]                                                         Operative Note    Daria Grew Location: ASC Perioperative   CSN [id number] MRN [id number]   Admission Date 11/12/2020 Procedure Date 11/12/2020   Attending Physician Gume Sanchez, from the thick adhesive band. This band was lysed allowing on twisting of the bowel. Extensive lysis of adhesions were was performed proximally in the area of the previous jejunojejunostomy. At this time continuity was restored.  Cecum was reached and revie

## 2020-11-12 NOTE — CONSULTS
[facility]  Report of Consultation    Evangelina Reasoner Patient Status:  Emergency    1965 MRN [id number]   Location [address] [place] [address] Attending Sharon Fajardo MD   [facility] Day # 0 PCP Abdulaziz Scott MD     2020    Reason for Performed by Raymond Morales MD at [address]   • OTHER SURGICAL HISTORY  2003    GASTRIC BYPASS, [facility]   • OTHER SURGICAL HISTORY      panniculectomy   • REDUCTION LEFT  2004   • REDUCTION RIGHT  2004   • UPPER GI ENDOSCOPY PERFORMED  11 Vitamin (MULTIVITAMIN OR), daily. , Disp: , Rfl:         Review of Systems:    10 point review performed pertinent positives and negatives per history of present illness.     Physical Exam:    Blood pressure 130/71, pulse 84, temperature 97.3 °F (36.3 °C), Umbilical abdomen pain with nausea,vomiting and bloating for past 36 hours   CONTRAST USED:  100cc of Omnipaque 350  FINDINGS:  LIVER:  No enlargement, atrophy, abnormal density, or significant focal lesion. BILIARY:  Status post cholecystectomy.   Mild pr There is a small amount of free pelvic fluid in the cul-de-sac. BONES:  Degenerative disc disease especially at L5-S1 with disc space narrowing and vacuum disc. Endplate spurring noted in the lower lumbar spine.   There is facet arthropathy also in the low Dr Acosta [facility], [address] [facility]  General Surgery  11/12/2020    PATIENT SEEN AND EXAMINED. CT PERSONALLY REVIEWED. HIGH GRADE SBO WITH CONCERN FOR COMPROMISED BOWEL. WILL PROCEED TO OR.  Jo Mac MD

## 2020-11-12 NOTE — ANESTHESIA PROCEDURE NOTES
Airway  Date/Time: 11/12/2020 12:23 PM  Urgency: elective    Airway not difficult    General Information and Staff    Patient location during procedure: OR  Anesthesiologist: Pavan Treadwell MD  Resident/CRNA: Emmie Mejía CRNA  Performed: GENE Beckman

## 2020-11-12 NOTE — H&P
[facility] Hospitalist H&P     CC: Patient presents with:  Abdomen/Flank Pain       PCP: Antoni Jordan MD      Assessment and Plan     Dallas Sessions is a 60year old female with PMH sig for HTN, anemia who presented with high grade SBO.     Now s/p ex lap with ESOPHAGOGASTRODUODENOSCOPY WITH/WITHOUT BIOPSY N/A 11/27/2019    Performed by Sandra Tracy MD at [address] 11/16/2013    Performed by Dilia Currie MD at [facility] ENDOSCOPY   • HYSTERECTOMY      ovaries remain   • LUMBAR EPIDURA 6. 5   HGB 13.9   MCV 86.2   .0       Recent Labs   Lab 11/12/20  0443      K 3.6      CO2 27.0   BUN 19*   CREATSERUM 0.76   *   CA 9.3       Recent Labs   Lab 11/12/20 0443   ALT 44   AST 24   ALB 3.6       No results for input(

## 2020-11-12 NOTE — ANESTHESIA PREPROCEDURE EVALUATION
PRE-OP EVALUATION    Patient Name: Preethi Negro    Pre-op Diagnosis: Bowel obstruction ([address] [E85.894]    Procedure(s):  EXPLORATORY LAPAROTOMY        Surgeon(s) and Role:     Yessi Espinosa MD - Primary    Pre-op vitals reviewed.   Temp: 97.7 °F (36 BIOPSY,POSSIBLE POLYPECTOMY N/A 11/27/2019    Performed by Compa Carter MD at [address] [place] [address]   • ESOPHAGOGASTRODUODENOSCOPY WITH/WITHOUT BIOPSY N/A 11/27/2019    Performed by Compa Carter MD at [address] [place] [address] N/A 11/16/2013    Perform patient. Comment: I explained intrinsic risks of general anesthesia, including nausea, dental damage, sore throat, mouth injury,and hoarseness from airway management. All questions were answered and understanding was demonstrated of risks.   Informed pe

## 2020-11-12 NOTE — BRIEF OP NOTE
Pre-Operative Diagnosis: Bowel obstruction ([address] [K56.609]     Post-Operative Diagnosis: Bowel obstruction ([address] [K56.609]      Procedure Performed:   Procedure(s):  EXPLORATORY LAPAROTOMY, EXTENSIVE LYSIS OF ADHESIONS        Surgeon(s) and Role:     * Gra

## 2020-11-13 ENCOUNTER — APPOINTMENT (OUTPATIENT)
Dept: MRI IMAGING | Facility: HOSPITAL | Age: 55
DRG: 336 | End: 2020-11-13
Attending: Other
Payer: COMMERCIAL

## 2020-11-13 ENCOUNTER — APPOINTMENT (OUTPATIENT)
Dept: CT IMAGING | Facility: HOSPITAL | Age: 55
DRG: 336 | End: 2020-11-13
Attending: HOSPITALIST
Payer: COMMERCIAL

## 2020-11-13 PROBLEM — M21.372 LEFT FOOT DROP: Status: ACTIVE | Noted: 2020-11-13

## 2020-11-13 PROBLEM — R29.898 TRANSIENT LEFT LEG WEAKNESS: Status: ACTIVE | Noted: 2020-11-13

## 2020-11-13 PROCEDURE — 72148 MRI LUMBAR SPINE W/O DYE: CPT | Performed by: OTHER

## 2020-11-13 PROCEDURE — 99223 1ST HOSP IP/OBS HIGH 75: CPT | Performed by: OTHER

## 2020-11-13 PROCEDURE — 74177 CT ABD & PELVIS W/CONTRAST: CPT | Performed by: HOSPITALIST

## 2020-11-13 PROCEDURE — 71275 CT ANGIOGRAPHY CHEST: CPT | Performed by: HOSPITALIST

## 2020-11-13 PROCEDURE — 70551 MRI BRAIN STEM W/O DYE: CPT | Performed by: OTHER

## 2020-11-13 NOTE — PROGRESS NOTES
[id number] [facility] Neurology Preliminary Note    Jp Caoing Patient Status:  Inpatient    1965 MRN [id number]   [facility] 0SW-A Attending Gayatri Oscar MD   Hosp Day # 1 PCP Radha Sorensen MD     REASON FOR CONSULTATION:    [facility] MANAG[facility]   • OTHER SURGICAL HISTORY  2003    GASTRIC BYPASS, [facility]   • OTHER SURGICAL HISTORY      panniculectomy   • REDUCTION LEFT  2004   • REDUCTION RIGHT  2004   • UPPER GI ENDOSCOPY PERFORMED  11/27/19= Gastric bypass   • UPPER GI ENDOSCOPY,BIOPSY  1 infusion, , Intravenous, Continuous    •  Enoxaparin Sodium (LOVENOX) 40 MG/0.4ML injection 40 mg, 40 mg, Subcutaneous, Nightly    •  phenol (CHLORASEPTIC) 1.4 % oral liquid spray 1 mL, 1 spray, Mouth/Throat, PRN    •  Metoclopramide HCl (REGLAN) injection bilateral lower lobe atelectasis/consolidation when compared to 11/12/2020 CT. 3. Post surgical changes with mild pneumoperitoneum. Small to moderate amount of hemorrhage within the pelvis.   Improvement of small bowel dilatation and obstruction noted on

## 2020-11-13 NOTE — PROGRESS NOTES
PT IS A&O X 4. SHE IS NPO. SHE HAS NG TUBE TO LOW INTERMITTENT SUCTION. SHE HAS DILAUDID PCA. SHE IS ON TELE RUNNING NSR. SHE IS RIGHT ARM PRECAUTIONS DO TO EXTENDED IV TO RIGHT FA. PT HAS LEFT SIDED WEAKNESS HAS NEUROLOGY ON. WILL CONTINUE TO MONITOR.

## 2020-11-13 NOTE — PROGRESS NOTES
[facility]  Progress Note    Evangelina Kc Patient Status:  Inpatient    1965 MRN [id number]   [facility] 0SW-A Attending Sharon Fajardo MD   Hosp Day # 1 PCP Abdulaziz Scott MD     Subjective:  Events of last night noted.  Left cassy

## 2020-11-13 NOTE — PAYOR COMM NOTE
--------------  ADMISSION REVIEW     Payor: [address] #:  [id number]  Authorization Number: [id number]    Admit date: 11/12/20  Admit time: 4276       [facility] Temp src Temporal   SpO2 97 %   O2 Device None (Room air)    Ht 165.1 cm (5' 5\")   Wt 89.4 kg   SpO2 97%   BMI 32.78     Physical Exam  Abdominal:      General: There is distension ( mild). Palpations: Abdomen is soft. Tenderness:  There is gen Bow[facility] [K56.609]     Procedure Performed: Procedure(s):  EXPLORATORY LAPAROTOMY, EXTENSIVE LYSIS OF ADHESIONS    Operative Summary: patient taken the operating room placed in a supine position. She was prepped and draped in usual fashion. suture. Dermabond was placed. Patient was awoken and taken to recovery in satisfactory condition.          [facility] Hospitalist H&P     Marilee Frank is a 60year old female with PMH sig for HTN, anemia who presented with high grade SBO.     Now s/p ex lap wi surgery     SBO  -s/p ex lap with extensive lysis of adhesions 11/12/20  -prn IV pain meds for post op pain, currently on dilaudid PCA, MS stable, Resp status stable  -monitor expected acute blood loss anemia, post op hgb 12.3  -per surgery     YOHAN  -mild 20 mg Intravenous Lety Amador RN      fentaNYL citrate (SUBLIMAZE) 0.05 MG/ML injection     Date Action Dose Route User    11/12/2020 1337 Given 25 mcg Intravenous Grazyna GENE Ronquillo    11/12/2020 1334 Given 25 mcg Intravenous Grazyna GENE Ronquillo mg Intravenous Rommimelia Hull CRNA      Neostigmine Methylsulfate Sarah Altman) injection     Date Action Dose Route User    11/12/2020 1316 Given 4 mg Intravenous Boecker, Sarah, CRNA      ondansetron HCl [facility]) injection     Date Action Dose Route User

## 2020-11-13 NOTE — PROGRESS NOTES
[facility] Hospitalist Progress Note     PCP: Mariya Ulrich MD    CC: Follow up       Assessment/Plan:     Marilee Frank is a 60year old female with PMH sig for HTN, anemia who presented with high grade SBO.     Now s/p ex lap with extensive lysis of adhesions (37 °C)] 97.7 °F (36.5 °C)  Pulse:  [76-88] 84  Resp:  [14-20] 18  BP: (130-179)/() 151/97    Intake/Output:    Intake/Output Summary (Last 24 hours) at 11/13/2020 0854  Last data filed at 11/13/2020 0600  Gross per 24 hour   Intake 387 ml   Output 1 K 3.6 3.5    109   CO2 27.0 25.0   BUN 19* 28*   CREATSERUM 0.76 1.10*   CA 9.3 8.5   * 98       Recent Labs   Lab 11/12/20  0443   ALT 44   AST 24   ALB 3.6       Recent Labs   Lab 11/13/20  0542   PGLU 98       No results for input(s): TRO

## 2020-11-13 NOTE — PROGRESS NOTES
2215: MD paged to notify of change in pt condition. 2219: callback received, order for stat CT, will implement.

## 2020-11-13 NOTE — PLAN OF CARE
Upon assessment, pt is A&O x4, VSS and afebrile. RA, . NSR on tele. SCDs on. NPO. NG to low intermittent suction in right nare. Zofran and reglan given for nausea. Pastor in place draining clear, yellow/ campbell urine.  Up with SB. IV fluids, IV abx infusin Progressing  Goal: Maintains adequate nutritional intake (undernourished)  Description: INTERVENTIONS:  - Monitor percentage of each meal consumed  - Identify factors contributing to decreased intake, treat as appropriate  - Assist with meals as needed  -

## 2020-11-13 NOTE — CM/SW NOTE
11/13/20 1000   CM/SW Screening   Referral Source Social Work (self-referral)   Information Source Chart review;Nursing rounds   Patient's Mental Status Alert;Oriented   Patient Status Prior to Admission   Independent with ADLs and Mobility Yes   MSW re

## 2020-11-13 NOTE — CONSULTS
[facility]    Report of Consultation    Eben Lopez Patient Status:  Inpatient    1965 MRN [id number]   [facility] 0SW-A Attending Paty Boyer MD   [facility] Day # 1 PCP Florian Avila MD     Date of Admission:  2020  [facility] 11/16/2013    Performed by Jamie Abbott MD at [facility] ENDOSCOPY   • HYSTERECTOMY      ovaries remain   • LUMBAR EPIDURAL N/A 5/28/2014    Performed by Duyen Hopson MD at [address] [place] [address]   • OTHER SURGICAL HISTORY  2003    GASTRIC BYPASS infusion, , Intravenous, Continuous  •  Enoxaparin Sodium (LOVENOX) 40 MG/0.4ML injection 40 mg, 40 mg, Subcutaneous, Nightly  •  phenol (CHLORASEPTIC) 1.4 % oral liquid spray 1 mL, 1 spray, Mouth/Throat, PRN  •  Metoclopramide HCl (REGLAN) injection 10 mg white matter. 3. Small focus of gradient susceptibility in the posterior right frontal lobe likely represents an area of chronic microhemorrhage and/or focal mineralization.            Assessment/Plan:  Patient Active Problem List:     Sacroiliitis, not

## 2020-11-13 NOTE — PROGRESS NOTES
12: MD page for change in pt condition, awaiting callback. 3841: call back received. Per MD call RR.    0520: RR called, pt c/o numbness in left foot and hand, denies pain. RRT doctor at bedside, assessed pt. Per RRT doctor, get neuro consult.  This RN w

## 2020-11-14 PROCEDURE — 99233 SBSQ HOSP IP/OBS HIGH 50: CPT | Performed by: OTHER

## 2020-11-14 RX ORDER — HYDRALAZINE HYDROCHLORIDE 20 MG/ML
5 INJECTION INTRAMUSCULAR; INTRAVENOUS EVERY 6 HOURS PRN
Status: DISCONTINUED | OUTPATIENT
Start: 2020-11-14 | End: 2020-11-17

## 2020-11-14 RX ORDER — AMLODIPINE BESYLATE 5 MG/1
5 TABLET ORAL DAILY
Status: DISCONTINUED | OUTPATIENT
Start: 2020-11-14 | End: 2020-11-16

## 2020-11-14 RX ORDER — POTASSIUM CHLORIDE 14.9 MG/ML
20 INJECTION INTRAVENOUS ONCE
Status: COMPLETED | OUTPATIENT
Start: 2020-11-14 | End: 2020-11-14

## 2020-11-14 RX ORDER — LABETALOL HYDROCHLORIDE 5 MG/ML
10 INJECTION, SOLUTION INTRAVENOUS EVERY 6 HOURS PRN
Status: DISCONTINUED | OUTPATIENT
Start: 2020-11-14 | End: 2020-11-17

## 2020-11-14 NOTE — PROGRESS NOTES
1050 NGT clamped. 1100 Pastor d/c'd. Patient tolerated well. Catheter intact. 12 [facility] Paged Dr. Khanh Fabian regarding increased nausea and pain after clamping NGT. NGT reconnected to low intermittent suction.       [id number] [facility] Dr. Khanh Fabian wants NGT to stay on low inte

## 2020-11-14 NOTE — PROGRESS NOTES
[facility] Hospitalist Progress Note     PCP: Kamlesh Muñoz MD    CC: Follow up       Assessment/Plan:     Nancie Cogan is a 60year old female with PMH sig for HTN, anemia who presented with high grade SBO.     Now s/p ex lap with extensive lysis of adhesions kg)  11/12/20 0429 : 197 lb (89.4 kg)  11/13/20 0921 : 197 lb (89.4 kg)  09/10/20 0753 : 189 lb (85.7 kg)      Exam  Gen: No acute distress, alert and oriented, no facial droop, speech clear  HEENT: MMM  Pulm: CTA b/l  CV: RRR  Abd: Abdomen tender as expec white matter. 3. Small focus of gradient susceptibility in the posterior right frontal lobe likely represents an area of chronic microhemorrhage and/or focal mineralization.      Dictated by (CST): Mimi Bhakta MD on 11/13/2020 at 11:10 AM     Finalized noted as described above. The obstruction may reflect changes related to an internal hernia or complex adhesion. Surgical consultation is recommended. Please see above for details. 2. Postsurgical changes of previous gastric bypass.     Dictated by (CST)

## 2020-11-14 NOTE — PROGRESS NOTES
Pt resting in bed this am. Pt able to move left and right leg without difficulty however left foot drop still present and left foot is weaker than right when flexing. Pt able to get oob with standby assistance without difficulty.  Will page Dr. Urszula Holbrook to see

## 2020-11-14 NOTE — PLAN OF CARE
A&Ox4. VSS. Tele-NSR. Patient reports moderate pain and uses PCA pump with relief. NGT to low intermittent suction with brown/bile drainage. NGT at 49 cm. Abdomen soft, nondistended, and tender. Bowel sounds hypoactive.  NGT clamped and patient experienced effectiveness of GI medications  - Encourage mobilization and activity  - Obtain nutritional consult as needed  - Establish a toileting routine/schedule  - Consider collaborating with pharmacy to review patient's medication profile  Outcome: Progressing  G

## 2020-11-14 NOTE — PROGRESS NOTES
[address] Yuliya Watt Neurology Progress Note    [facility] Patient Status:  Inpatient    1965 MRN [id number]   [facility] 0SW-A Attending Yulia Godwin MD   Hosp Day # 2 PCP Dl Benedict MD       [facility]      Neurology At and anemia, who was initially hospitalized for high grade SBO, now s/p exploratory lap with extensive lysis of adhesions on 11/12. In the afternoon of 11/12, patient noted numbness to her left side, with some weakness. Patient sitting up in bed.   Stat bilaterally at L4-5 with mild subarticular zone narrowing on the right at L3-4  Mild central spinal canal stenosis at L4-5  Multilevel foraminal narrowing, greatest on the left at L1-2 and bilaterally at L4-5 and L5-S1.      11/13/2020 MRI Brain  No acute

## 2020-11-15 ENCOUNTER — APPOINTMENT (OUTPATIENT)
Dept: ULTRASOUND IMAGING | Facility: HOSPITAL | Age: 55
DRG: 336 | End: 2020-11-15
Attending: HOSPITALIST
Payer: COMMERCIAL

## 2020-11-15 PROCEDURE — 76700 US EXAM ABDOM COMPLETE: CPT | Performed by: HOSPITALIST

## 2020-11-15 PROCEDURE — 93970 EXTREMITY STUDY: CPT | Performed by: HOSPITALIST

## 2020-11-15 RX ORDER — NALBUPHINE HCL 10 MG/ML
10 AMPUL (ML) INJECTION
Status: DISCONTINUED | OUTPATIENT
Start: 2020-11-15 | End: 2020-11-17

## 2020-11-15 NOTE — PHYSICAL THERAPY NOTE
PHYSICAL THERAPY EVALUATION - INPATIENT     Room Number: 6920/6842-U  Evaluation Date: 11/15/2020  Type of Evaluation: Initial  Physician Order: PT Eval and Treat    Presenting Problem: S/p exp lap11/12, L sided weakness  Reason for Therapy: Mobility Problems:    Azotemia    Hyperglycemia    Small bowel obstruction (HCC)    Transient left leg weakness    Left foot drop      Past Medical History  Past Medical History:   Diagnosis Date   • Anemia, unspecified    • Headache(784.0)    • OSTEOARTHRITIS    • L side back.     OBJECTIVE  Precautions: Limb alert - right;Other (Comment)(NG to suction)  Fall Risk: High fall risk    WEIGHT BEARING RESTRICTION  Weight Bearing Restriction: None                PAIN ASSESSMENT  Ratin  Location: Posterior thighs bilat etc.): A Little   -   Moving from lying on back to sitting on the side of the bed?: None   How much help from another person does the patient currently need. ..   -   Moving to and from a bed to a chair (including a wheelchair)?: A Little   -   Need to walk spondylolisthesis, HTN , OA.   In this PT evaluation, the patient presents with the following impairments L sided weakness, difficulty w/ motor coordination both L ue and b le's during gait, L foot/ankle complex inverted at rest, pain in b le's, decreased b

## 2020-11-15 NOTE — PLAN OF CARE
Pt alert and oriented x 4. Up with assist, PT to eval. Left sided weakness and numbness. Neuro following. Lung sounds clear to diminished on room air, . KATTY. Telemetry NSR on the monitor. Abdomen soft and rounded, bowel sounds present.  Pt reports passin toileting routine/schedule  - Consider collaborating with pharmacy to review patient's medication profile  Outcome: Progressing  Goal: Maintains adequate nutritional intake (undernourished)  Description: INTERVENTIONS:  - Monitor percentage of each meal co

## 2020-11-15 NOTE — PROGRESS NOTES
[facility] Hospitalist Progress Note     PCP: Celeste Mike MD    CC: Follow up       Assessment/Plan:     Tawnya Monroe is a 60year old female with PMH sig for HTN, anemia who presented with high grade SBO.     Now s/p ex lap with extensive lysis of adhesions surgery     Dispo: pending clinical course    High complexity of MDM based on 4 or more problem points and current use of high risk medications including dilaudid PCA.      Narda Cogan, MD  [facility] IM Hospitalist  Answering service: [phone]            Sub 86.3   .0 211.0 172.0 163.0       Recent Labs   Lab 11/12/20  0443 11/13/20  0543 11/14/20  0653 11/14/20  1731 11/15/20  0512    140 141 138 139   K 3.6 3.5 3.7 4.3 3.4*    109 110 108 106   CO2 27.0 25.0 25.0 26.0 25.0   BUN 19* 28* 14 11/13/2020 at 9:38 PM       Cta Chest + Ct Abd (w) + Ct Pel (w) Sh(cpt=71275/74659)    Result Date: 11/13/2020  CONCLUSION:  1. Suboptimal bolus despite repeating examination with newly placed IV.   No definite filling defect appreciated to suggest pulmonar

## 2020-11-15 NOTE — PROGRESS NOTES
[facility]  Progress Note    Jesus Gamez Patient Status:  Inpatient    1965 MRN [id number]   [facility] 0SW-A Attending Emanuel Gilliam MD   Hosp Day # 3 PCP Eliza Chapman MD     Subjective:    Some nausea with NG clamping yeste bypass     History of incisional hernia repair     Chronic constipation     Tension headache     TMJ dysfunction     Azotemia     Hyperglycemia     Abdominal pain, acute     Small bowel obstruction (HCC)     Transient left leg weakness     Left foot drop

## 2020-11-16 RX ORDER — AMLODIPINE BESYLATE 5 MG/1
10 TABLET ORAL DAILY
Status: DISCONTINUED | OUTPATIENT
Start: 2020-11-17 | End: 2020-11-17

## 2020-11-16 RX ORDER — HYDROCODONE BITARTRATE AND ACETAMINOPHEN 5; 325 MG/1; MG/1
2 TABLET ORAL EVERY 4 HOURS PRN
Status: DISCONTINUED | OUTPATIENT
Start: 2020-11-16 | End: 2020-11-16

## 2020-11-16 RX ORDER — OXYCODONE HYDROCHLORIDE AND ACETAMINOPHEN 5; 325 MG/1; MG/1
1 TABLET ORAL EVERY 6 HOURS PRN
Status: DISCONTINUED | OUTPATIENT
Start: 2020-11-16 | End: 2020-11-17

## 2020-11-16 RX ORDER — OXYCODONE HYDROCHLORIDE AND ACETAMINOPHEN 5; 325 MG/1; MG/1
1 TABLET ORAL EVERY 4 HOURS PRN
Status: DISCONTINUED | OUTPATIENT
Start: 2020-11-16 | End: 2020-11-17

## 2020-11-16 RX ORDER — OXYCODONE HYDROCHLORIDE AND ACETAMINOPHEN 5; 325 MG/1; MG/1
1 TABLET ORAL EVERY 4 HOURS PRN
Status: DISCONTINUED | OUTPATIENT
Start: 2020-11-16 | End: 2020-11-16

## 2020-11-16 RX ORDER — TRAMADOL HYDROCHLORIDE 50 MG/1
50 TABLET ORAL EVERY 6 HOURS PRN
Status: DISCONTINUED | OUTPATIENT
Start: 2020-11-16 | End: 2020-11-17

## 2020-11-16 RX ORDER — HYDROCODONE BITARTRATE AND ACETAMINOPHEN 5; 325 MG/1; MG/1
1 TABLET ORAL EVERY 4 HOURS PRN
Status: DISCONTINUED | OUTPATIENT
Start: 2020-11-16 | End: 2020-11-16

## 2020-11-16 NOTE — CM/SW NOTE
Care Progression Note:  Active Acute Medical Issue:   Abdominal pain, acute, high grade SBO- ex lap with extensive lysis of adhesions 11/12/20. Currently passing flatus, tolerating oral diet, NG tube to be removed today.     Other Contributing Medical Facto

## 2020-11-16 NOTE — PHYSICAL THERAPY NOTE
PHYSICAL THERAPY TREATMENT NOTE - INPATIENT    Room Number: 3387/9457-Y     Session: 1   Number of Visits to Meet Established Goals: 5    Presenting Problem: S/p exp lap11/12, L sided weakness    History related to current admission: Pt is 60year old fem Bx taken   • UPPER GI ENDOSCOPY,DIAGNOSIS  11/11    Dr. Harpreet Mccracken, Jules-en-Y, SB Bx negative       SUBJECTIVE  Pt reports she is relieved to have NG tube removed and is requesting apple juice     Patient’s self-stated goal is to have a BM     OBJECTIVE  Precauti Sup>sit CGA HOB elevated. Pt denies dizziness, pt gait trained to BR c RW and CGA, toilet t/f supervision. SBA at sink for hand hygiene. Pt gait trained in jackson c RW and CGA to supervision, pt cued for keeping B hands on RW.    Pt returned to sit in BS

## 2020-11-16 NOTE — PLAN OF CARE
Pt a/ox4, room air, , tele (70s), L sided weakness- numbness and tingling to LLE/LUE. NG clamped secured at 49cm. C/o of abd/gas pain- assessed, pt pushed pca pump. IVF infusing. Midline w/ skinglue- abd/tape cdi. Tolerating clear liquid diet.  Up stand Monitor percentage of each meal consumed  - Identify factors contributing to decreased intake, treat as appropriate  - Assist with meals as needed  - Monitor I&O, WT and lab values  - Obtain nutritional consult as needed  - Optimize oral hygiene and moistu

## 2020-11-16 NOTE — OCCUPATIONAL THERAPY NOTE
Attempted to see pt this PM, pt stating she is in too much pain at this time, OT will follow up as schedule permits.

## 2020-11-16 NOTE — HOME CARE LIAISON
Received referral from [address] [place] [address]. [facility] unable to accept due to staffing shortage in patient's service area. Re-referred to [facility], reserved Provider in [address]. VELMA Wolf updated.  Thank you for this referral.

## 2020-11-16 NOTE — PLAN OF CARE
Patient  A/O X 3. Medicated for elevated BP. BP improving upon reassessment. NG tube removed per orders. PCA discontinued, patient instructed on PO pain medication. Advanced to full liquids.        Problem: Patient/Family Goals  Goal: Patient/Family Long Te meals as needed  - Monitor I&O, WT and lab values  - Obtain nutritional consult as needed  - Optimize oral hygiene and moisture  - Encourage food from home; allow for food preferences  - Enhance eating environment  Outcome: Progressing  Goal: Achieves appr

## 2020-11-17 VITALS
HEART RATE: 83 BPM | DIASTOLIC BLOOD PRESSURE: 94 MMHG | RESPIRATION RATE: 18 BRPM | BODY MASS INDEX: 32.82 KG/M2 | WEIGHT: 197 LBS | TEMPERATURE: 99 F | OXYGEN SATURATION: 100 % | HEIGHT: 65 IN | SYSTOLIC BLOOD PRESSURE: 152 MMHG

## 2020-11-17 RX ORDER — POTASSIUM CHLORIDE 20 MEQ/1
40 TABLET, EXTENDED RELEASE ORAL EVERY 4 HOURS
Status: DISCONTINUED | OUTPATIENT
Start: 2020-11-17 | End: 2020-11-17

## 2020-11-17 RX ORDER — OXYCODONE HYDROCHLORIDE AND ACETAMINOPHEN 5; 325 MG/1; MG/1
1 TABLET ORAL EVERY 4 HOURS PRN
Qty: 15 TABLET | Refills: 0 | Status: SHIPPED | OUTPATIENT
Start: 2020-11-17 | End: 2020-11-22

## 2020-11-17 NOTE — CM/SW NOTE
Patient may dc tonight if able to tolerate her diet. [facility] [address] to service- contact information placed on AVS.   NO further home needs identified at this time.     Patrick Worthington RN, BSN   [phone]

## 2020-11-17 NOTE — PROGRESS NOTES
NURSING DISCHARGE NOTE    Discharged Home via Wheelchair. Accompanied by Spouse  Belongings Taken by patient/family. PT D/C VIA WHEELCHAIR IN STABLE CONDITION. REMOVED IV. EDUCATED PT ON INCISION CARE AND S&S OF AN INFECTION.  INSTRUCTED PT TO SCHED

## 2020-11-17 NOTE — PROGRESS NOTES
[facility]  Progress Note    Faith Ortiz Patient Status:  Inpatient    1965 MRN [id number]   [facility] 0SW-A Attending Hanh Garcia MD   Hosp Day # 5 PCP Chris Keene MD     Subjective:    Patient tolerated full liquids yes Hyperglycemia     Abdominal pain, acute     Small bowel obstruction (HCC)     Transient left leg weakness     Left foot drop      Impression/Plan:     61 y/o S/P: ex lap, BRENNON  hgb stable, no leukocytosis  Tolerating full liquids- reports low fiber didn't a

## 2020-11-17 NOTE — PLAN OF CARE
Pt a/ox4, tolerating soft diet, no pain, n/v at this time. IVF infusing. Midline w/skin glue - no drainage. L sided weakness w/ left foot drop. Bowel sounds active, lungs clear and diminished. Pt compliant w/ poc. Call light within reach.  Will continue to contributing to decreased intake, treat as appropriate  - Assist with meals as needed  - Monitor I&O, WT and lab values  - Obtain nutritional consult as needed  - Optimize oral hygiene and moisture  - Encourage food from home; allow for food preferences  -

## 2020-11-17 NOTE — DISCHARGE SUMMARY
General Medicine Discharge Summary     Patient ID:  Jennell Hashimoto  60year old  6/1/1965    Admit date: 11/12/2020    Discharge date and time:11/17/2020      Attending Physician: Carlos Sigala MD possible peroneal or L5 neuropathy     # SBO  - s/p ex lap with extensive lysis of adhesions 11/12/20  - DC NG, dc pca     # Acute blood loss anemia  - follow Hg  - no s/s of acute blood loss     # YOHAN - RESOLVED  -cont IVF and monitor     # HTN  - norvasc 98.5 °F (36.9 °C)       General: no acute distress, alert and oriented x 3  Heart: RRR  Lungs: clear bilaterally, no active wheezing  Abdomen: nontender, nondistended, intact BS  Extremities: no pedal edema   Neuro: CN inact, no focal deficits      Total t

## 2020-11-17 NOTE — CM/SW NOTE
CM received call from [facility] stating they are unable to accept patient. CM spoke with ALONZO Guy who confirmed they can service patient on 11/19/2020. Contact information placed on AVS. Call placed to patient to inform her of the change.

## 2020-11-17 NOTE — DIETARY NOTE
[address] [place] [address]     Admitting diagnosis:  Small bowel obstruction ([facility] [address] [G70.675]  Abdominal pain, acute [R10.9]    Ht: 165.1 cm (5' 5\")  Wt: 89.4 kg (197 lb). This is 159 % of IBW  Body mass index is 32.78 kg/m².

## 2020-11-18 NOTE — PAYOR COMM NOTE
--------------  DISCHARGE REVIEW    Payor: [address] #:  [id number]  Authorization Number: [id number]    Admit date: 11/12/20  Admit time:  1514  Discharge Date: 11/17/2020  1:44 PM     Admitting Physician: Gisel Joshua if you feel worse in any way, Return to the ER if you have any concerns    - Labs: none  - Radiology: none    Hospital Course:      Courtney Hillty a 60year old female with PMH sig for HTN, anemia who presented with high grade SBO.     Now s/p ex lap w DAILY. Fluticasone Propionate 50 MCG/ACT Nasal Suspension  1-2 sprays by Nasal route daily. in each nostril. Point it up and out towards your eye. Call if you get nose bleeding    Meloxicam 10 MG Oral Cap  Take 10 mg by mouth daily as needed.       docu

## (undated) DEVICE — PROXIMATE SKIN STAPLERS (35 WIDE) CONTAINS 35 STAINLESS STEEL STAPLES (FIXED HEAD): Brand: PROXIMATE

## (undated) DEVICE — DRAPE EQUIPMENT INTRATEMP THER

## (undated) DEVICE — GOWN,SIRUS,FABRIC-REINFORCED,X-LARGE: Brand: MEDLINE

## (undated) DEVICE — POOLE SUCTION HANDLE: Brand: CARDINAL HEALTH

## (undated) DEVICE — LIGHT HANDLE

## (undated) DEVICE — STANDARD HYPODERMIC NEEDLE,POLYPROPYLENE HUB: Brand: MONOJECT

## (undated) DEVICE — SOL  .9 1000ML BTL

## (undated) DEVICE — SYRINGE 20CC LL TIP

## (undated) DEVICE — REM POLYHESIVE ADULT PATIENT RETURN ELECTRODE: Brand: VALLEYLAB

## (undated) DEVICE — STERILE SYNTHETIC POLYISOPRENE POWDER-FREE SURGICAL GLOVES WITH HYDROGEL COATING, SMOOTH FINISH, STRAIGHT FINGER: Brand: PROTEXIS

## (undated) DEVICE — SUTURE PDS II 0 CTX

## (undated) DEVICE — SUTURE SILK 2-0

## (undated) DEVICE — LAPAROTOMY CDS: Brand: MEDLINE INDUSTRIES, INC.

## (undated) DEVICE — TOWEL OR BLU 16X26 STRL

## (undated) DEVICE — SUTURE SILK 0

## (undated) DEVICE — SUTURE SILK 3-0

## (undated) DEVICE — SUTURE VICRYL 3-0 SH

## (undated) DEVICE — CHLORAPREP 26ML APPLICATOR

## (undated) DEVICE — KENDALL SCD EXPRESS SLEEVES, KNEE LENGTH, MEDIUM: Brand: KENDALL SCD

## (undated) DEVICE — SUTURE SILK 3-0 SH

## (undated) DEVICE — SUTURE MONOCRYL 4-0 PS-2

## (undated) DEVICE — VIOLET BRAIDED (POLYGLACTIN 910), SYNTHETIC ABSORBABLE SUTURE: Brand: COATED VICRYL

## (undated) DEVICE — DRESSING AQUACEL AG 3.5X12